# Patient Record
Sex: MALE | Race: WHITE | NOT HISPANIC OR LATINO | Employment: FULL TIME | URBAN - METROPOLITAN AREA
[De-identification: names, ages, dates, MRNs, and addresses within clinical notes are randomized per-mention and may not be internally consistent; named-entity substitution may affect disease eponyms.]

---

## 2018-11-07 PROCEDURE — 95886 MUSC TEST DONE W/N TEST COMP: CPT | Performed by: PSYCHIATRY & NEUROLOGY

## 2018-11-07 PROCEDURE — 95909 NRV CNDJ TST 5-6 STUDIES: CPT | Performed by: PSYCHIATRY & NEUROLOGY

## 2018-11-13 ENCOUNTER — OUTSIDE FACILITY SERVICE (OUTPATIENT)
Dept: NEUROLOGY | Facility: CLINIC | Age: 68
End: 2018-11-13

## 2021-08-14 ENCOUNTER — APPOINTMENT (OUTPATIENT)
Dept: GENERAL RADIOLOGY | Facility: HOSPITAL | Age: 71
End: 2021-08-14

## 2021-08-14 ENCOUNTER — HOSPITAL ENCOUNTER (EMERGENCY)
Facility: HOSPITAL | Age: 71
Discharge: HOME | End: 2021-08-14
Attending: EMERGENCY MEDICINE | Admitting: EMERGENCY MEDICINE

## 2021-08-14 VITALS
HEART RATE: 84 BPM | HEIGHT: 68 IN | TEMPERATURE: 99.5 F | WEIGHT: 194 LBS | DIASTOLIC BLOOD PRESSURE: 73 MMHG | BODY MASS INDEX: 29.4 KG/M2 | SYSTOLIC BLOOD PRESSURE: 131 MMHG | OXYGEN SATURATION: 97 % | RESPIRATION RATE: 22 BRPM

## 2021-08-14 DIAGNOSIS — U07.1 PNEUMONIA DUE TO COVID-19 VIRUS: Primary | ICD-10-CM

## 2021-08-14 DIAGNOSIS — E86.0 MILD DEHYDRATION: ICD-10-CM

## 2021-08-14 DIAGNOSIS — R53.83 FATIGUE, UNSPECIFIED TYPE: ICD-10-CM

## 2021-08-14 DIAGNOSIS — J12.82 PNEUMONIA DUE TO COVID-19 VIRUS: Primary | ICD-10-CM

## 2021-08-14 LAB
ALBUMIN SERPL-MCNC: 3.9 G/DL (ref 3.5–5.2)
ALBUMIN/GLOB SERPL: 1.1 G/DL
ALP SERPL-CCNC: 66 U/L (ref 39–117)
ALT SERPL W P-5'-P-CCNC: 23 U/L (ref 1–41)
ANION GAP SERPL CALCULATED.3IONS-SCNC: 13.3 MMOL/L (ref 5–15)
AST SERPL-CCNC: 29 U/L (ref 1–40)
BASOPHILS # BLD AUTO: 0.02 10*3/MM3 (ref 0–0.2)
BASOPHILS NFR BLD AUTO: 0.2 % (ref 0–1.5)
BILIRUB SERPL-MCNC: 0.8 MG/DL (ref 0–1.2)
BUN SERPL-MCNC: 20 MG/DL (ref 8–23)
BUN/CREAT SERPL: 13 (ref 7–25)
CALCIUM SPEC-SCNC: 9.7 MG/DL (ref 8.6–10.5)
CHLORIDE SERPL-SCNC: 96 MMOL/L (ref 98–107)
CO2 SERPL-SCNC: 23.7 MMOL/L (ref 22–29)
CREAT SERPL-MCNC: 1.54 MG/DL (ref 0.76–1.27)
DEPRECATED RDW RBC AUTO: 40.8 FL (ref 37–54)
EOSINOPHIL # BLD AUTO: 0.02 10*3/MM3 (ref 0–0.4)
EOSINOPHIL NFR BLD AUTO: 0.2 % (ref 0.3–6.2)
ERYTHROCYTE [DISTWIDTH] IN BLOOD BY AUTOMATED COUNT: 12.4 % (ref 12.3–15.4)
GFR SERPL CREATININE-BSD FRML MDRD: 45 ML/MIN/1.73
GLOBULIN UR ELPH-MCNC: 3.4 GM/DL
GLUCOSE SERPL-MCNC: 142 MG/DL (ref 65–99)
HCT VFR BLD AUTO: 41.1 % (ref 37.5–51)
HGB BLD-MCNC: 13.9 G/DL (ref 13–17.7)
HOLD SPECIMEN: NORMAL
HOLD SPECIMEN: NORMAL
IMM GRANULOCYTES # BLD AUTO: 0.03 10*3/MM3 (ref 0–0.05)
IMM GRANULOCYTES NFR BLD AUTO: 0.4 % (ref 0–0.5)
LYMPHOCYTES # BLD AUTO: 0.95 10*3/MM3 (ref 0.7–3.1)
LYMPHOCYTES NFR BLD AUTO: 11.6 % (ref 19.6–45.3)
MAGNESIUM SERPL-MCNC: 2.4 MG/DL (ref 1.6–2.4)
MCH RBC QN AUTO: 30.8 PG (ref 26.6–33)
MCHC RBC AUTO-ENTMCNC: 33.8 G/DL (ref 31.5–35.7)
MCV RBC AUTO: 90.9 FL (ref 79–97)
MONOCYTES # BLD AUTO: 0.53 10*3/MM3 (ref 0.1–0.9)
MONOCYTES NFR BLD AUTO: 6.5 % (ref 5–12)
NEUTROPHILS NFR BLD AUTO: 6.63 10*3/MM3 (ref 1.7–7)
NEUTROPHILS NFR BLD AUTO: 81.1 % (ref 42.7–76)
NRBC BLD AUTO-RTO: 0 /100 WBC (ref 0–0.2)
NT-PROBNP SERPL-MCNC: 384.7 PG/ML (ref 0–900)
PLATELET # BLD AUTO: 196 10*3/MM3 (ref 140–450)
PMV BLD AUTO: 10.2 FL (ref 6–12)
POTASSIUM SERPL-SCNC: 3.8 MMOL/L (ref 3.5–5.2)
PROT SERPL-MCNC: 7.3 G/DL (ref 6–8.5)
QT INTERVAL: 369 MS
RBC # BLD AUTO: 4.52 10*6/MM3 (ref 4.14–5.8)
SODIUM SERPL-SCNC: 133 MMOL/L (ref 136–145)
TROPONIN T SERPL-MCNC: <0.01 NG/ML (ref 0–0.03)
WBC # BLD AUTO: 8.18 10*3/MM3 (ref 3.4–10.8)
WHOLE BLOOD HOLD SPECIMEN: NORMAL

## 2021-08-14 PROCEDURE — 93005 ELECTROCARDIOGRAM TRACING: CPT

## 2021-08-14 PROCEDURE — 80053 COMPREHEN METABOLIC PANEL: CPT

## 2021-08-14 PROCEDURE — 99284 EMERGENCY DEPT VISIT MOD MDM: CPT

## 2021-08-14 PROCEDURE — 71045 X-RAY EXAM CHEST 1 VIEW: CPT

## 2021-08-14 PROCEDURE — 96374 THER/PROPH/DIAG INJ IV PUSH: CPT

## 2021-08-14 PROCEDURE — 85025 COMPLETE CBC W/AUTO DIFF WBC: CPT

## 2021-08-14 PROCEDURE — 93005 ELECTROCARDIOGRAM TRACING: CPT | Performed by: EMERGENCY MEDICINE

## 2021-08-14 PROCEDURE — 25010000002 ONDANSETRON PER 1 MG: Performed by: EMERGENCY MEDICINE

## 2021-08-14 PROCEDURE — 84484 ASSAY OF TROPONIN QUANT: CPT

## 2021-08-14 PROCEDURE — 93010 ELECTROCARDIOGRAM REPORT: CPT | Performed by: INTERNAL MEDICINE

## 2021-08-14 PROCEDURE — 83735 ASSAY OF MAGNESIUM: CPT | Performed by: EMERGENCY MEDICINE

## 2021-08-14 PROCEDURE — 83880 ASSAY OF NATRIURETIC PEPTIDE: CPT

## 2021-08-14 RX ORDER — ONDANSETRON 2 MG/ML
4 INJECTION INTRAMUSCULAR; INTRAVENOUS ONCE
Status: COMPLETED | OUTPATIENT
Start: 2021-08-14 | End: 2021-08-14

## 2021-08-14 RX ORDER — ONDANSETRON 4 MG/1
4 TABLET, ORALLY DISINTEGRATING ORAL EVERY 8 HOURS PRN
Qty: 12 TABLET | Refills: 0 | Status: SHIPPED | OUTPATIENT
Start: 2021-08-14 | End: 2021-08-17

## 2021-08-14 RX ORDER — SODIUM CHLORIDE 0.9 % (FLUSH) 0.9 %
10 SYRINGE (ML) INJECTION AS NEEDED
Status: DISCONTINUED | OUTPATIENT
Start: 2021-08-14 | End: 2021-08-14 | Stop reason: HOSPADM

## 2021-08-14 RX ADMIN — SODIUM CHLORIDE, POTASSIUM CHLORIDE, SODIUM LACTATE AND CALCIUM CHLORIDE 1000 ML: 600; 310; 30; 20 INJECTION, SOLUTION INTRAVENOUS at 13:13

## 2021-08-14 RX ADMIN — ONDANSETRON 4 MG: 2 INJECTION INTRAMUSCULAR; INTRAVENOUS at 13:14

## 2021-08-14 NOTE — DISCHARGE INSTRUCTIONS
Take medications as prescribed, continue current medications, stay well-hydrated, follow-up with primary care provider as needed, home isolation directed, ED return for worsening symptoms as needed.

## 2021-08-14 NOTE — ED TRIAGE NOTES
Patient to er from home with family at side with c/o he tested positive for covid 7 days ago. Today patient is having increasing in soa along with nausea/ vomiting/ diarrhea and chills. Patient has mask on in triage along with staff.

## 2021-08-14 NOTE — ED PROVIDER NOTES
" EMERGENCY DEPARTMENT ENCOUNTER    Room Number:  20/20  Date of encounter:  8/14/2021  PCP: Shade Thomas MD  Historian: Patient      HPI:  Chief Complaint: Covid, fatigue, shortness of breath  A complete HPI/ROS/PMH/PSH/SH/FH are unobtainable due to: None    Context: Miguel Rockwell is a 70 y.o. male who presents to the ED via private vehicle for evaluation for 1 week of developing weakness, fatigue, mild cough mild exertional dyspnea, nausea, dry heaving, and diarrhea.  States that today he just got tired of feeling ill and wanted to come in and get checked out.  Patient is unvaccinated, he states that he had an asymptomatic infection last summer and did not feel he needed it, also states that he \"kept hearing bad things about the vaccine.\"  Patient denies any measured fevers, slightly decreased p.o. intake.  No recent travel or surgeries.  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      MEDICAL RECORD REVIEW    Cardiac echo July 13 of 2021    The estimated ejection fraction is 55 to 60% with overall left ventricular function normal.  Mild left ventricular hypertrophy.  Trace mitral regurgitation.  Trace tricuspid regurgitation.  Trace pulmonic insufficiency.    PAST MEDICAL HISTORY  Active Ambulatory Problems     Diagnosis Date Noted   • No Active Ambulatory Problems     Resolved Ambulatory Problems     Diagnosis Date Noted   • No Resolved Ambulatory Problems     No Additional Past Medical History         PAST SURGICAL HISTORY  Past Surgical History:   Procedure Laterality Date   • CORONARY ANGIOPLASTY WITH STENT PLACEMENT           FAMILY HISTORY  History reviewed. No pertinent family history.      SOCIAL HISTORY  Social History     Socioeconomic History   • Marital status:      Spouse name: Not on file   • Number of children: Not on file   • Years of education: Not on file   • Highest education level: Not on file   Tobacco Use   • Smoking status: Former Smoker   • Smokeless " tobacco: Never Used   Substance and Sexual Activity   • Alcohol use: Not Currently   • Drug use: Not Currently         ALLERGIES  Patient has no known allergies.        REVIEW OF SYSTEMS  Review of Systems     All systems reviewed and negative except for those discussed in HPI.       PHYSICAL EXAM    I have reviewed the triage vital signs and nursing notes.    ED Triage Vitals [08/14/21 0932]   Temp Heart Rate Resp BP SpO2   99.5 °F (37.5 °C) 52 22 96/65 94 %      Temp src Heart Rate Source Patient Position BP Location FiO2 (%)   Tympanic -- Sitting -- --       Physical Exam  General: Awake, alert, no acute distress  HEENT: Mucous membranes moist, atraumatic, EOMI  Neck: Full ROM  Pulm: Symmetric chest rise, nonlabored, lungs CTAB  Cardiovascular: Regular rate and rhythm, intact distal pulses  GI: Soft, nontender, nondistended, no rebound, no guarding, bowel sounds present  MSK: Full ROM, no deformity  Skin: Warm, dry  Neuro: Alert and oriented x 3, GCS 15, moving all extremities, no focal deficits  Psych: Calm, cooperative      I wore an N95 mask, gown, faceshield, and gloves used during this encounter. Patient in surgical mask.      LAB RESULTS  Recent Results (from the past 24 hour(s))   Comprehensive Metabolic Panel    Collection Time: 08/14/21  9:44 AM    Specimen: Blood   Result Value Ref Range    Glucose 142 (H) 65 - 99 mg/dL    BUN 20 8 - 23 mg/dL    Creatinine 1.54 (H) 0.76 - 1.27 mg/dL    Sodium 133 (L) 136 - 145 mmol/L    Potassium 3.8 3.5 - 5.2 mmol/L    Chloride 96 (L) 98 - 107 mmol/L    CO2 23.7 22.0 - 29.0 mmol/L    Calcium 9.7 8.6 - 10.5 mg/dL    Total Protein 7.3 6.0 - 8.5 g/dL    Albumin 3.90 3.50 - 5.20 g/dL    ALT (SGPT) 23 1 - 41 U/L    AST (SGOT) 29 1 - 40 U/L    Alkaline Phosphatase 66 39 - 117 U/L    Total Bilirubin 0.8 0.0 - 1.2 mg/dL    eGFR Non African Amer 45 (L) >60 mL/min/1.73    Globulin 3.4 gm/dL    A/G Ratio 1.1 g/dL    BUN/Creatinine Ratio 13.0 7.0 - 25.0    Anion Gap 13.3 5.0 -  15.0 mmol/L   BNP    Collection Time: 08/14/21  9:44 AM    Specimen: Blood   Result Value Ref Range    proBNP 384.7 0.0 - 900.0 pg/mL   Troponin    Collection Time: 08/14/21  9:44 AM    Specimen: Blood   Result Value Ref Range    Troponin T <0.010 0.000 - 0.030 ng/mL   Green Top (Gel)    Collection Time: 08/14/21  9:44 AM   Result Value Ref Range    Extra Tube Hold for add-ons.    Lavender Top    Collection Time: 08/14/21  9:44 AM   Result Value Ref Range    Extra Tube hold for add-on    Gold Top - SST    Collection Time: 08/14/21  9:44 AM   Result Value Ref Range    Extra Tube Hold for add-ons.    CBC Auto Differential    Collection Time: 08/14/21  9:44 AM    Specimen: Blood   Result Value Ref Range    WBC 8.18 3.40 - 10.80 10*3/mm3    RBC 4.52 4.14 - 5.80 10*6/mm3    Hemoglobin 13.9 13.0 - 17.7 g/dL    Hematocrit 41.1 37.5 - 51.0 %    MCV 90.9 79.0 - 97.0 fL    MCH 30.8 26.6 - 33.0 pg    MCHC 33.8 31.5 - 35.7 g/dL    RDW 12.4 12.3 - 15.4 %    RDW-SD 40.8 37.0 - 54.0 fl    MPV 10.2 6.0 - 12.0 fL    Platelets 196 140 - 450 10*3/mm3    Neutrophil % 81.1 (H) 42.7 - 76.0 %    Lymphocyte % 11.6 (L) 19.6 - 45.3 %    Monocyte % 6.5 5.0 - 12.0 %    Eosinophil % 0.2 (L) 0.3 - 6.2 %    Basophil % 0.2 0.0 - 1.5 %    Immature Grans % 0.4 0.0 - 0.5 %    Neutrophils, Absolute 6.63 1.70 - 7.00 10*3/mm3    Lymphocytes, Absolute 0.95 0.70 - 3.10 10*3/mm3    Monocytes, Absolute 0.53 0.10 - 0.90 10*3/mm3    Eosinophils, Absolute 0.02 0.00 - 0.40 10*3/mm3    Basophils, Absolute 0.02 0.00 - 0.20 10*3/mm3    Immature Grans, Absolute 0.03 0.00 - 0.05 10*3/mm3    nRBC 0.0 0.0 - 0.2 /100 WBC   Magnesium    Collection Time: 08/14/21  9:44 AM    Specimen: Blood   Result Value Ref Range    Magnesium 2.4 1.6 - 2.4 mg/dL   ECG 12 Lead    Collection Time: 08/14/21  9:55 AM   Result Value Ref Range    QT Interval 369 ms       Ordered the above labs and independently reviewed the results.        RADIOLOGY  XR Chest 1 View    Result Date:  8/14/2021  XR CHEST 1 VW-  HISTORY: Male who is 70 years-old,  short of breath, Covid 19  TECHNIQUE: Frontal view of the chest  COMPARISON: None available  FINDINGS: The heart size is normal. Aorta is calcified. Pulmonary vasculature is unremarkable. Mild patchy infiltrates peripherally at the mid to lower lungs, although nonspecific, are compatible with Covid pneumonia, follow-up recommended. No pleural effusion, or pneumothorax. No acute osseous process.      Mild patchy infiltrates peripherally at the mid to lower lungs, although nonspecific, are compatible with Covid pneumonia, follow-up recommended.  This report was finalized on 8/14/2021 12:05 PM by Dr. Jesus Vega M.D.        I ordered the above noted radiological studies. Reviewed by me.  See dictation for official radiology interpretation.      PROCEDURES    Procedures  EKG    0955  Sinus rhythm with a rate of 89  Normal axis  Normal intervals  Low voltage in extremity leads, no acute ischemic changes  No STEMI    Interpreted Contemporaneously by me, independently viewed  No prior for comparison      MEDICATIONS GIVEN IN ER    Medications   sodium chloride 0.9 % flush 10 mL (has no administration in time range)   lactated ringers bolus 1,000 mL (0 mL Intravenous Stopped 8/14/21 1557)   ondansetron (ZOFRAN) injection 4 mg (4 mg Intravenous Given 8/14/21 1314)         PROGRESS, DATA ANALYSIS, CONSULTS, AND MEDICAL DECISION MAKING    All labs have been independently reviewed by me.  All radiology studies have been reviewed by me and discussed with radiologist dictating the report.   EKG's independently viewed and interpreted by me.  Discussion below represents my analysis of pertinent findings related to patient's condition, differential diagnosis, treatment plan and final disposition.    Patient is known Covid positive, can concerns for progressive pneumonia, renal failure, electrolyte abnormalities, among others.    While patient does demonstrate a  pneumonia, he has no increased work of breathing or hypoxia.  Mildly elevated creatinine from baseline, will give some IV fluids but this point no need for emergent hospitalization.  Supportive care at home, ED return for worsening symptoms as needed.    ED Course as of Aug 14 1710   Sat Aug 14, 2021   1118 WBC: 8.18 [DC]   1118 Hemoglobin: 13.9 [DC]   1118 1.3 two months ago   Creatinine(!): 1.54 [DC]   1119 Potassium: 3.8 [DC]   1119 Troponin T: <0.010 [DC]   1119 proBNP: 384.7 [DC]   1345 Patient remains well-appearing and in no acute distress, no tachypnea or increased work of breathing, no hypoxia.  Slightly dehydrated on lab and imaging, much improved after liter of fluids.  Plan for discharge home with continued supportive care measures.  Will discharge with a pulse oximeter if available with return instructions.  ED return for worsening symptoms as needed.    [DC]      ED Course User Index  [DC] Krishan Munson MD       AS OF 17:10 EDT VITALS:    BP - 131/73  HR - 84  TEMP - 99.5 °F (37.5 °C) (Tympanic)  02 SATS - 97%        DIAGNOSIS  Final diagnoses:   Pneumonia due to COVID-19 virus   Fatigue, unspecified type   Mild dehydration         DISPOSITION  DISCHARGE    Patient discharged in stable condition.    Reviewed implications of results, diagnosis, meds, responsibility to follow up, warning signs and symptoms of possible worsening, potential complications and reasons to return to ER.    Patient/Family voiced understanding of above instructions.    Discussed plan for discharge, as there is no emergent indication for admission. Patient referred to primary care provider for BP management due to today's BP. Pt/family is agreeable and understands need for follow up and repeat testing.  Pt is aware that discharge does not mean that nothing is wrong but it indicates no emergency is present that requires admission and they must continue care with follow-up as given below or physician of their choice.      FOLLOW-UP  [facility] Emergency Department  [address]  [facility] [address] [phone]    As needed, If symptoms worsen    Shade Thomas MD  [address] [facility] [address]  [facility] [address]  [phone]    Schedule an appointment as soon as possible for a visit            Medication List      New Prescriptions    ondansetron ODT 4 MG disintegrating tablet  Commonly known as: ZOFRAN-ODT  Place 1 tablet on the tongue Every 8 (Eight) Hours As Needed for Nausea or Vomiting for up to 3 days.           Where to Get Your Medications      These medications were sent to [facility] [address] - [facility] [address] - [phone]  - [phone]   [address], [facility] [address]    Phone: [phone]   · ondansetron ODT 4 MG disintegrating tablet                    Krishan Munson MD  08/14/21 1792